# Patient Record
Sex: FEMALE | URBAN - METROPOLITAN AREA
[De-identification: names, ages, dates, MRNs, and addresses within clinical notes are randomized per-mention and may not be internally consistent; named-entity substitution may affect disease eponyms.]

---

## 2021-06-15 ENCOUNTER — OFFICE VISIT (OUTPATIENT)
Dept: URBAN - METROPOLITAN AREA CLINIC 11 | Facility: CLINIC | Age: 81
End: 2021-06-15
Payer: COMMERCIAL

## 2021-06-15 DIAGNOSIS — H04.123 DRY EYE SYNDROME OF BILATERAL LACRIMAL GLANDS: ICD-10-CM

## 2021-06-15 PROCEDURE — 99204 OFFICE O/P NEW MOD 45 MIN: CPT | Performed by: OPTOMETRIST

## 2021-06-15 ASSESSMENT — VISUAL ACUITY
OS: 20/30
OD: 20/40

## 2021-06-15 ASSESSMENT — KERATOMETRY
OS: 43.75
OD: 43.63

## 2021-06-15 ASSESSMENT — INTRAOCULAR PRESSURE
OD: 14
OS: 14

## 2021-06-15 NOTE — IMPRESSION/PLAN
Impression: Other subjective visual disturbances: H53.19. Plan: Hx of possible stroke.  Recommend VF 24-2. f/u 1-2 weeks VF

## 2021-06-28 ENCOUNTER — OFFICE VISIT (OUTPATIENT)
Dept: URBAN - METROPOLITAN AREA CLINIC 11 | Facility: CLINIC | Age: 81
End: 2021-06-28
Payer: COMMERCIAL

## 2021-06-28 PROCEDURE — 92083 EXTENDED VISUAL FIELD XM: CPT | Performed by: OPTOMETRIST

## 2021-06-28 PROCEDURE — 99213 OFFICE O/P EST LOW 20 MIN: CPT | Performed by: OPTOMETRIST

## 2021-06-28 ASSESSMENT — INTRAOCULAR PRESSURE
OS: 17
OD: 17

## 2021-06-28 NOTE — IMPRESSION/PLAN
Impression: Bilateral peripheral visual field defect: H53.453. Plan: Hx of possible stroke. Visual Field defects- right hemianopsia OU, defects correlate with stroke symptoms. Recommend patient to keep consult with neurology.  f/u 6 months

## 2021-11-01 ENCOUNTER — OFFICE VISIT (OUTPATIENT)
Dept: URBAN - METROPOLITAN AREA CLINIC 11 | Facility: CLINIC | Age: 81
End: 2021-11-01
Payer: COMMERCIAL

## 2021-11-01 DIAGNOSIS — H53.453 OTHER LOCALIZED VISUAL FIELD DEFECT, BILATERAL: ICD-10-CM

## 2021-11-01 DIAGNOSIS — H53.19 OTHER SUBJECTIVE VISUAL DISTURBANCES: Primary | ICD-10-CM

## 2021-11-01 DIAGNOSIS — H25.13 AGE-RELATED NUCLEAR CATARACT, BILATERAL: ICD-10-CM

## 2021-11-01 PROCEDURE — 99214 OFFICE O/P EST MOD 30 MIN: CPT | Performed by: OPTOMETRIST

## 2021-11-01 PROCEDURE — 92083 EXTENDED VISUAL FIELD XM: CPT | Performed by: OPTOMETRIST

## 2021-11-01 ASSESSMENT — KERATOMETRY
OD: 43.25
OS: 43.88

## 2021-11-01 ASSESSMENT — INTRAOCULAR PRESSURE
OD: 16
OS: 16

## 2021-11-01 NOTE — IMPRESSION/PLAN
Impression: Bilateral peripheral visual field defect: H53.453. VF 11/21 OD: right shady, OS: right shady F/u 1 year. Plan: Hx of possible stroke. VF ordered and preformed today. VF defects OU. Call if [address] worsens.  Cont care with neurologist. f/u 1 year

## 2022-07-26 ENCOUNTER — OFFICE VISIT (OUTPATIENT)
Facility: LOCATION | Age: 82
End: 2022-07-26
Payer: COMMERCIAL

## 2022-07-26 DIAGNOSIS — B00.52 HERPESVIRAL KERATITIS: Primary | ICD-10-CM

## 2022-07-26 PROCEDURE — 99213 OFFICE O/P EST LOW 20 MIN: CPT | Performed by: OPTOMETRIST

## 2022-07-26 ASSESSMENT — INTRAOCULAR PRESSURE
OS: 15
OD: 16

## 2022-07-26 NOTE — IMPRESSION/PLAN
Impression: Herpesviral keratitis: B00.52. Plan: Patient states rash started after Covid Vaccine. Patient was seen by PCP over the weekend and was started on Valtrex. Questionable if true shingles as lesions are on both sides of the face. No ocular involvement. Continue Valtrex as directed. Advised second opinion from PCP or Dermatologist if no improvement.

## 2022-11-28 ENCOUNTER — OFFICE VISIT (OUTPATIENT)
Facility: LOCATION | Age: 82
End: 2022-11-28
Payer: COMMERCIAL

## 2022-11-28 DIAGNOSIS — H53.453 BILATERAL PERIPHERAL VISUAL FIELD DEFECT: ICD-10-CM

## 2022-11-28 DIAGNOSIS — H53.19 OTHER SUBJECTIVE VISUAL DISTURBANCES: Primary | ICD-10-CM

## 2022-11-28 DIAGNOSIS — H25.13 AGE-RELATED NUCLEAR CATARACT, BILATERAL: ICD-10-CM

## 2022-11-28 DIAGNOSIS — B02.39 OTHER HERPES ZOSTER EYE DISEASE: ICD-10-CM

## 2022-11-28 PROCEDURE — 92083 EXTENDED VISUAL FIELD XM: CPT | Performed by: OPTOMETRIST

## 2022-11-28 PROCEDURE — 99214 OFFICE O/P EST MOD 30 MIN: CPT | Performed by: OPTOMETRIST

## 2022-11-28 ASSESSMENT — INTRAOCULAR PRESSURE
OD: 16
OS: 16

## 2022-11-28 ASSESSMENT — VISUAL ACUITY
OD: 20/30
OS: 20/30

## 2022-11-28 NOTE — IMPRESSION/PLAN
Impression: Bilateral peripheral visual field defect: H53.453. VF 11/21 OD: right shady, OS: right shady
- HX of stroke - Followed by Neurology PRN 
-monitor annually with 30-2 OU. Plan: Stable , monitor.